# Patient Record
Sex: FEMALE | Race: BLACK OR AFRICAN AMERICAN | NOT HISPANIC OR LATINO | URBAN - METROPOLITAN AREA
[De-identification: names, ages, dates, MRNs, and addresses within clinical notes are randomized per-mention and may not be internally consistent; named-entity substitution may affect disease eponyms.]

---

## 2020-12-16 ENCOUNTER — OFFICE VISIT (OUTPATIENT)
Dept: FAMILY MEDICINE | Facility: CLINIC | Age: 30
End: 2020-12-16
Payer: MEDICAID

## 2020-12-16 VITALS
WEIGHT: 293 LBS | HEIGHT: 70 IN | OXYGEN SATURATION: 99 % | DIASTOLIC BLOOD PRESSURE: 80 MMHG | TEMPERATURE: 97 F | BODY MASS INDEX: 41.95 KG/M2 | RESPIRATION RATE: 18 BRPM | HEART RATE: 74 BPM | SYSTOLIC BLOOD PRESSURE: 122 MMHG

## 2020-12-16 DIAGNOSIS — R10.13 DYSPEPSIA: ICD-10-CM

## 2020-12-16 DIAGNOSIS — R43.8: ICD-10-CM

## 2020-12-16 DIAGNOSIS — K21.9 GASTROESOPHAGEAL REFLUX DISEASE WITHOUT ESOPHAGITIS: ICD-10-CM

## 2020-12-16 DIAGNOSIS — Z11.3 SCREEN FOR STD (SEXUALLY TRANSMITTED DISEASE): ICD-10-CM

## 2020-12-16 DIAGNOSIS — F41.9 ANXIETY AND DEPRESSION: Primary | ICD-10-CM

## 2020-12-16 DIAGNOSIS — F32.A ANXIETY AND DEPRESSION: Primary | ICD-10-CM

## 2020-12-16 DIAGNOSIS — N93.8 DUB (DYSFUNCTIONAL UTERINE BLEEDING): ICD-10-CM

## 2020-12-16 PROCEDURE — 99204 OFFICE O/P NEW MOD 45 MIN: CPT | Mod: S$GLB,,, | Performed by: NURSE PRACTITIONER

## 2020-12-16 PROCEDURE — 99204 PR OFFICE/OUTPT VISIT, NEW, LEVL IV, 45-59 MIN: ICD-10-PCS | Mod: S$GLB,,, | Performed by: NURSE PRACTITIONER

## 2020-12-16 RX ORDER — SERTRALINE HYDROCHLORIDE 25 MG/1
25 TABLET, FILM COATED ORAL DAILY
Qty: 30 TABLET | Refills: 11 | Status: SHIPPED | OUTPATIENT
Start: 2020-12-16 | End: 2021-12-16

## 2020-12-16 RX ORDER — PANTOPRAZOLE SODIUM 40 MG/1
40 TABLET, DELAYED RELEASE ORAL DAILY
Qty: 30 TABLET | Refills: 11 | Status: SHIPPED | OUTPATIENT
Start: 2020-12-16 | End: 2021-12-16

## 2020-12-16 NOTE — PROGRESS NOTES
"Subjective:      Patient ID: Abigail Doe is a 30 y.o. female.    Chief Complaint: Establish Care (Prior pcp Chani Coleman of [facility] )      30-year-old female here today to establish care    Primary complaints are dyspepsia, generalized abdominal cramping, and abdominal fullness.  She reports reflux.  No prior treatment.  Denies fever, chills, myalgia, vomiting, diarrhea, constipation.  + salt taste at times.     Sec issue is irregular menstrual cycles.  Patient states that she is passing clots and has heavy menses. Hx of tubal ligation. C section x 2.     3rd issue is anxiety and depression. She denies SI, HI, delusion, grandiosity. Reports episodic "breakdowns".  Last only hours. Feels overwhelmed at times. Was on meds in the past with prior PCP but can not remember the name. + anxiety with wearing a mask. Denies flight of ideas. No hx of Bipolar.     Past Medical History:   Diagnosis Date    Anxiety and depression       Social History     Socioeconomic History    Marital status: Single     Spouse name: Not on file    Number of children: Not on file    Years of education: Not on file    Highest education level: Not on file   Occupational History    Not on file   Social Needs    Financial resource strain: Not on file    Food insecurity     Worry: Not on file     Inability: Not on file    Transportation needs     Medical: Not on file     Non-medical: Not on file   Tobacco Use    Smoking status: Current Every Day Smoker     Packs/day: 3.00     Years: 15.00     Pack years: 45.00     Types: Cigarettes    Smokeless tobacco: Never Used   Substance and Sexual Activity    Alcohol use: Yes     Frequency: Monthly or less     Comment: OCASSIONAL    Drug use: Never    Sexual activity: Not on file   Lifestyle    Physical activity     Days per week: Not on file     Minutes per session: Not on file    Stress: Not on file   Relationships    Social connections     Talks on phone: Not on file     " Gets together: Not on file     Attends [employer] service: Not on file     Active member of club or organization: Not on file     Attends meetings of clubs or organizations: Not on file     Relationship status: Not on file   Other Topics Concern    Not on file   Social History Narrative    Not on file      Family History   Problem Relation Age of Onset    Cancer Maternal Aunt         2020        ROS:   Review of Systems   Constitutional: Positive for appetite change. Negative for chills, diaphoresis, fatigue and fever.   Eyes: Negative for photophobia and visual disturbance.   Respiratory: Negative for cough, shortness of breath and wheezing.    Cardiovascular: Negative for chest pain, palpitations and leg swelling.   Gastrointestinal: Positive for abdominal distention. Negative for abdominal pain, anal bleeding, blood in stool, constipation, diarrhea, nausea, rectal pain and vomiting.   Endocrine: Negative for polydipsia, polyphagia and polyuria.   Genitourinary: Positive for menstrual problem and vaginal bleeding. Negative for dyspareunia, dysuria, frequency, genital sores, hematuria, pelvic pain, vaginal discharge and vaginal pain.   Musculoskeletal: Negative for gait problem and myalgias.   Skin: Negative for rash.   Allergic/Immunologic: Negative for immunocompromised state.   Neurological: Negative for dizziness, weakness and headaches.   Hematological: Negative for adenopathy. Does not bruise/bleed easily.   Psychiatric/Behavioral: Positive for dysphoric mood. Negative for agitation, behavioral problems, confusion, decreased concentration, hallucinations, self-injury, sleep disturbance and suicidal ideas. The patient is nervous/anxious. The patient is not hyperactive.      Objective:   Physical Exam  Vitals signs and nursing note reviewed.   Constitutional:       Appearance: Normal appearance. She is obese. She is not ill-appearing or toxic-appearing.   HENT:      Head: Normocephalic.      Nose: Nose  normal.      Mouth/Throat:      Mouth: Mucous membranes are moist.   Eyes:      General: No scleral icterus.     Pupils: Pupils are equal, round, and reactive to light.   Neck:      Thyroid: No thyromegaly.   Cardiovascular:      Rate and Rhythm: Normal rate and regular rhythm.      Pulses: Normal pulses.      Heart sounds: No murmur.   Pulmonary:      Effort: Pulmonary effort is normal.      Breath sounds: Normal breath sounds.   Abdominal:      General: Abdomen is flat. Bowel sounds are normal. There is no distension.      Palpations: Abdomen is soft.      Tenderness: There is no abdominal tenderness.   Lymphadenopathy:      Cervical: No cervical adenopathy.   Skin:     General: Skin is warm and dry.      Coloration: Skin is not jaundiced.      Findings: No rash.   Neurological:      Mental Status: She is alert and oriented to person, place, and time. Mental status is at baseline.   Psychiatric:         Attention and Perception: Attention and perception normal. She does not perceive auditory hallucinations.         Mood and Affect: Mood and affect normal.         Speech: Speech normal.         Behavior: Behavior normal. Behavior is cooperative.         Thought Content: Thought content normal.         Cognition and Memory: Cognition and memory normal.         Judgment: Judgment normal.       Assessment:     1. Anxiety and depression    2. DUB (dysfunctional uterine bleeding)    3. Secondary salt taste disorder    4. Gastroesophageal reflux disease without esophagitis    5. Dyspepsia    6. Screen for STD (sexually transmitted disease)      No images are attached to the encounter.   Plan:     Problem List Items Addressed This Visit        Psychiatric    Anxiety and depression - Primary    Current Assessment & Plan     Start zoloft. RTC in 2 weeks or sooner if worsening.          Relevant Medications    sertraline (ZOLOFT) 25 MG tablet    pantoprazole (PROTONIX) 40 MG tablet    Other Relevant Orders    CBC Auto  Differential    Comprehensive Metabolic Panel    TSH+Free T4    Urinalysis, Reflex to Urine Culture Urine, Clean Catch    Hemoglobin A1C    Vitamin D    Lipase    Lipid Panel    TSH+Free T4       GI    Gastroesophageal reflux disease without esophagitis    Current Assessment & Plan     Start Protonix. RTC 2 weeks.          Relevant Medications    sertraline (ZOLOFT) 25 MG tablet    pantoprazole (PROTONIX) 40 MG tablet    Other Relevant Orders    CBC Auto Differential    Comprehensive Metabolic Panel    TSH+Free T4    Urinalysis, Reflex to Urine Culture Urine, Clean Catch    Hemoglobin A1C    Vitamin D    Lipase    Lipid Panel    H. PYLORI ANTIBODY, IGG      Other Visit Diagnoses     DUB (dysfunctional uterine bleeding)        Refer to OB/GYN for pelvic exam and STD screening.     Relevant Medications    sertraline (ZOLOFT) 25 MG tablet    pantoprazole (PROTONIX) 40 MG tablet    Other Relevant Orders    CBC Auto Differential    Comprehensive Metabolic Panel    TSH+Free T4    Urinalysis, Reflex to Urine Culture Urine, Clean Catch    Hemoglobin A1C    Vitamin D    Lipase    Lipid Panel    Ambulatory referral/consult to Obstetrics / Gynecology    TSH+Free T4    Secondary salt taste disorder        ? GERD?     Relevant Medications    sertraline (ZOLOFT) 25 MG tablet    pantoprazole (PROTONIX) 40 MG tablet    Other Relevant Orders    CBC Auto Differential    Comprehensive Metabolic Panel    TSH+Free T4    Urinalysis, Reflex to Urine Culture Urine, Clean Catch    Hemoglobin A1C    Vitamin D    Lipase    Lipid Panel    TSH+Free T4    Dyspepsia        R/o h pylori    Relevant Medications    sertraline (ZOLOFT) 25 MG tablet    pantoprazole (PROTONIX) 40 MG tablet    Other Relevant Orders    CBC Auto Differential    Comprehensive Metabolic Panel    TSH+Free T4    Urinalysis, Reflex to Urine Culture Urine, Clean Catch    Hemoglobin A1C    Vitamin D    Lipase    Lipid Panel    H. PYLORI ANTIBODY, IGG    Screen for STD (sexually  transmitted disease)        Relevant Orders    HIV 1/2 Ag/Ab (4th Gen)    RPR    Ambulatory referral/consult to Obstetrics / Gynecology        Procedures       All diagnostic data (labs/imaging) was reviewed with the patient and/or family member in the room.  All questions were answered to their liking. The patient and/or family member voiced understanding of all instructions provided. Expectations regarding follow up and treatment plan were voiced and confirmed prior to departure. The patient was given orders/instructions at the end of the visit for reference.     Follow up:     There are no Patient Instructions on file for this visit.     Follow up in about 2 weeks (around 12/30/2020), or if symptoms worsen or fail to improve.

## 2023-05-29 ENCOUNTER — PATIENT MESSAGE (OUTPATIENT)
Dept: ADMINISTRATIVE | Facility: HOSPITAL | Age: 33
End: 2023-05-29
Payer: MEDICAID

## 2023-09-25 ENCOUNTER — PATIENT MESSAGE (OUTPATIENT)
Dept: ADMINISTRATIVE | Facility: HOSPITAL | Age: 33
End: 2023-09-25
Payer: MEDICAID